# Patient Record
Sex: FEMALE | Race: WHITE | NOT HISPANIC OR LATINO | Employment: OTHER | URBAN - METROPOLITAN AREA
[De-identification: names, ages, dates, MRNs, and addresses within clinical notes are randomized per-mention and may not be internally consistent; named-entity substitution may affect disease eponyms.]

---

## 2023-10-08 PROBLEM — M19.079 ARTHRITIS OF JOINT OF TOE: Status: ACTIVE | Noted: 2023-10-08

## 2023-10-08 PROBLEM — S90.30XA FOOT CONTUSION: Status: ACTIVE | Noted: 2023-10-08

## 2023-10-08 PROBLEM — M19.079 OA (OSTEOARTHRITIS) OF FOOT: Status: ACTIVE | Noted: 2023-10-08

## 2023-10-08 PROBLEM — G57.01 PIRIFORMIS SYNDROME OF RIGHT SIDE: Status: ACTIVE | Noted: 2023-10-08

## 2023-10-08 PROBLEM — S92.909K NONUNION OF FRACTURE OF FOOT: Status: ACTIVE | Noted: 2023-10-08

## 2023-10-08 RX ORDER — AMOXICILLIN AND CLAVULANATE POTASSIUM 875; 125 MG/1; MG/1
TABLET, FILM COATED ORAL
COMMUNITY
Start: 2023-04-14

## 2023-10-08 RX ORDER — MELOXICAM 15 MG/1
1 TABLET ORAL
COMMUNITY
Start: 2023-02-11

## 2023-10-08 RX ORDER — NITROFURANTOIN 25; 75 MG/1; MG/1
1 CAPSULE ORAL
COMMUNITY
Start: 2023-02-01

## 2023-10-08 RX ORDER — SIMVASTATIN 80 MG/1
1 TABLET, FILM COATED ORAL
COMMUNITY
Start: 2023-03-12

## 2023-10-08 RX ORDER — ZOLPIDEM TARTRATE 5 MG/1
1 TABLET ORAL
COMMUNITY
Start: 2023-04-18

## 2023-10-08 RX ORDER — ESCITALOPRAM OXALATE 20 MG/1
1 TABLET ORAL
COMMUNITY
Start: 2023-04-13

## 2023-10-08 RX ORDER — TOPIRAMATE 50 MG/1
1 TABLET, FILM COATED ORAL
COMMUNITY
Start: 2023-03-07

## 2023-10-08 RX ORDER — OMEPRAZOLE 40 MG/1
1 CAPSULE, DELAYED RELEASE ORAL
COMMUNITY
Start: 2023-04-06

## 2023-10-10 ENCOUNTER — ANCILLARY PROCEDURE (OUTPATIENT)
Dept: RADIOLOGY | Facility: CLINIC | Age: 78
End: 2023-10-10
Payer: MEDICARE

## 2023-10-10 ENCOUNTER — OFFICE VISIT (OUTPATIENT)
Dept: ORTHOPEDIC SURGERY | Facility: CLINIC | Age: 78
End: 2023-10-10
Payer: MEDICARE

## 2023-10-10 DIAGNOSIS — M25.551 PAIN OF RIGHT HIP: ICD-10-CM

## 2023-10-10 DIAGNOSIS — M70.61 TROCHANTERIC BURSITIS OF RIGHT HIP: Primary | ICD-10-CM

## 2023-10-10 PROCEDURE — 73502 X-RAY EXAM HIP UNI 2-3 VIEWS: CPT | Mod: RIGHT SIDE | Performed by: ORTHOPAEDIC SURGERY

## 2023-10-10 PROCEDURE — 2500000005 HC RX 250 GENERAL PHARMACY W/O HCPCS: Mod: PO | Performed by: ORTHOPAEDIC SURGERY

## 2023-10-10 PROCEDURE — 73502 X-RAY EXAM HIP UNI 2-3 VIEWS: CPT | Mod: RT,FY

## 2023-10-10 PROCEDURE — 2500000004 HC RX 250 GENERAL PHARMACY W/ HCPCS (ALT 636 FOR OP/ED): Mod: PO | Performed by: ORTHOPAEDIC SURGERY

## 2023-10-10 PROCEDURE — 20610 DRAIN/INJ JOINT/BURSA W/O US: CPT | Mod: PO | Performed by: ORTHOPAEDIC SURGERY

## 2023-10-10 PROCEDURE — 99214 OFFICE O/P EST MOD 30 MIN: CPT | Mod: PO | Performed by: ORTHOPAEDIC SURGERY

## 2023-10-10 PROCEDURE — 20610 DRAIN/INJ JOINT/BURSA W/O US: CPT | Performed by: ORTHOPAEDIC SURGERY

## 2023-10-10 PROCEDURE — 1159F MED LIST DOCD IN RCRD: CPT | Performed by: ORTHOPAEDIC SURGERY

## 2023-10-10 PROCEDURE — 99214 OFFICE O/P EST MOD 30 MIN: CPT | Performed by: ORTHOPAEDIC SURGERY

## 2023-10-10 RX ORDER — BETAMETHASONE SODIUM PHOSPHATE AND BETAMETHASONE ACETATE 3; 3 MG/ML; MG/ML
6 INJECTION, SUSPENSION INTRA-ARTICULAR; INTRALESIONAL; INTRAMUSCULAR; SOFT TISSUE ONCE
Status: COMPLETED | OUTPATIENT
Start: 2023-10-10 | End: 2023-10-10

## 2023-10-10 RX ORDER — LIDOCAINE HYDROCHLORIDE 10 MG/ML
2 INJECTION INFILTRATION; PERINEURAL ONCE
Status: COMPLETED | OUTPATIENT
Start: 2023-10-10 | End: 2023-10-10

## 2023-10-10 RX ADMIN — LIDOCAINE HYDROCHLORIDE 20 MG: 10 INJECTION, SOLUTION INFILTRATION; PERINEURAL at 15:25

## 2023-10-10 RX ADMIN — BETAMETHASONE SODIUM PHOSPHATE AND BETAMETHASONE ACETATE 6 MG: 3; 3 INJECTION, SUSPENSION INTRA-ARTICULAR; INTRALESIONAL; INTRAMUSCULAR at 15:25

## 2023-10-11 NOTE — PROGRESS NOTES
"                    History of present illness    78-year-old female presents complaining of right-sided hip pain she states the pain is mostly along the lateral side of the right hip and into the posterior aspect she has a history of bursitis and piriformis syndrome on the left side she had previous injections which gave her good relief.  She feels like her pelvis is \"off\" denies any numbness or tingling most of her soreness is in the lateral and posterior aspect of the hip      Past medical , Surgical, Family and social history reviewed.      Physical exam  General: No acute distress and breathing comfortably.  Patient is pleasant and cooperative with the examination.    Extremity  Good range of motion of the hip.  Flexion abduction external rotation maneuver is negative.  Moderate tenderness to palpation along the lateral side of the hip.  Pain with cross leg abduction.  Neurologically intact distally with full range of motion of the knee and ankle.  Good muscle strength distally with good sensation.  Compartments are soft calf is nontender.    Diagnostics  [ none]  XR hip right 2 or 3 views    Result Date: 10/10/2023  Interpreted By:  Andi Mohan, STUDY: XR HIP RIGHT 2 OR 3 VIEWS;  ;  10/10/2023 2:42 pm   INDICATION: Signs/Symptoms:pain.   ACCESSION NUMBER(S): [id number]   ORDERING CLINICIAN: ANDI MOHAN   FINDINGS: AP AP pelvis and lateral view of the right hip. The heart mild to moderate medial joint space narrowing bilateral hips slightly worse on the right than the left no evidence of fracture dislocation or other bony abnormality     Signed by: Andi Mohan 10/10/2023 2:49 PM Dictation workstation:   [id number]    NM LUNG VENT / PERF VQ    Result Date: 9/28/2023  * * *Final Report* * * DATE OF EXAM: Sep 28 2023  3:50PM   [facility]   0032  -  NM LUNG VENT / PERF VQ  / ACCESSION #  [id number] PROCEDURE REASON: Pulmonary hypertension (HCC)      * * * * Physician Interpretation * * * *  LUNG SCAN " CLINICAL HISTORY: Shortness of breath. TECHNIQUE: 0.8 mCi Tc 99m DTPA aerosol inhaled 5.1 mCi Tc 99m MAA IV Planar imaging of the lungs in multiple views (anterior, posterior, bilateral oblique and lateral views). RESULT: Ventilation images demonstrate clumping of the radiotracer centrally Decreased uptake at the posterior aspect of the left lower lobe appears somewhat nonsegmental, and perhaps partially mismatched    IMPRESSION: INTERMEDIATE probability, left lower lobe partial mismatch is favored to be artifactual, unresolved PE not excluded. Uniform uptake in the remaining bilateral lungs : [facility]   Transcribe Date/Time: Sep 28 2023  8:18P Dictated by : AUDREY REYNOLDS MD This examination was interpreted and the report reviewed and electronically signed by: AUDREY REYNOLDS MD on Sep 28 2023  9:01PM  EST       Procedure  Before aspiration/injection, the risks  of this procedure including but not limited to;  infection, local skin irritation, skin atrophy, calcification, continued pain or discomfort, elevated blood sugar, burning, failure to relieve pain, possible late infection were all discussed with the patient.  The patient verbalized understanding and consented to the procedure.   After informed consent was provided, patient identification was confirmed, and allergies were verified, the patient was appropriately positioned. The site was marked and time-out performed.  The injection site was prepped in the usual sterile manner to provide a sterile environment. The skin was anesthetized with ethyl chloride spray. The aspiration/injection was performed with standard technique. The needle was withdrawn and the puncture site was secured with a Band-Aid. The patient tolerated the procedure well without complication.   Post-procedure discomfort can be alleviated with additional medication, ice, elevation, and rest over the first 24 hours as recommended.  The injection was right hip bursa 1 cc Celestone  2 cc lidocaine      Assessment  Right hip bursitis with piriformis syndrome and SI joint dysfunction    Treatment plan  1.  The natural history of the condition and its associated treatment alternatives including surgical and nonsurgical options were discussed with the patient at length.  2.  Recommended cortisone injection right hip which was performed today with her consent without complication.  Patient understands the cortisone may provide temporary relief how much it helps her how long it lasts is unpredictable.  Cortisone can be repeated after 3 months if symptoms return.  She is also going to physical therapy for her core strength and pelvic stability and she will follow-up with me in a month.  3. [   ]  4.  All of the patient's questions were answered.    This note was prepared using voice recognition software.  The details of this note are correct and have been reviewed, and corrected to the best of my ability.  Some grammatical areas may persist related to the Dragon software    Juan Carlos Mohan MD  Senior Attending Physician  [facility]  Orthopedic [place]    [phone]

## 2023-11-14 ENCOUNTER — OFFICE VISIT (OUTPATIENT)
Dept: ORTHOPEDIC SURGERY | Facility: CLINIC | Age: 78
End: 2023-11-14
Payer: MEDICARE

## 2023-11-14 DIAGNOSIS — M70.61 TROCHANTERIC BURSITIS OF RIGHT HIP: Primary | ICD-10-CM

## 2023-11-14 PROCEDURE — 1159F MED LIST DOCD IN RCRD: CPT | Performed by: ORTHOPAEDIC SURGERY

## 2023-11-14 PROCEDURE — 99213 OFFICE O/P EST LOW 20 MIN: CPT | Mod: PO | Performed by: ORTHOPAEDIC SURGERY

## 2023-11-14 PROCEDURE — 99213 OFFICE O/P EST LOW 20 MIN: CPT | Performed by: ORTHOPAEDIC SURGERY

## 2023-11-14 NOTE — PROGRESS NOTES
History of present illness    78-year-old female follow-up right hip bursitis had a cortisone injection at her last visit on October 10 states she is 80% better still little bit achy and sore but she know she is got back issues also no numbness or ting no fevers or chills she has some difficulty getting in and out of a car      Past medical , Surgical, Family and social history reviewed.      Physical exam  General: No acute distress and breathing comfortably.  Patient is pleasant and cooperative with the examination.    Extremity  Good range of motion of her hip minimal tenderness ambulates without alimp neurologically intact distally brisk capillary refill compartments soft calves nontender    Diagnostics      No results found.     Procedure  [ none]    Assessment  Trochanteric bursitis right hip    Treatment plan  1.  The natural history of the condition and its associated treatment alternatives including surgical and nonsurgical options were discussed with the patient at length.  2.  Patient understands the cortisone may provide temporary relief how much it helps her how long it lasts is unpredictable.  Cortisone can be repeated after 3 months if symptoms return.  3.  Follow-up as needed  4.  All of the patient's questions were answered.    This note was prepared using voice recognition software.  The details of this note are correct and have been reviewed, and corrected to the best of my ability.  Some grammatical areas may persist related to the Dragon software    Juan Carlos Mohan MD  Senior Attending Physician  [facility]  Orthopedic [place]    [phone]

## 2024-04-02 ENCOUNTER — OFFICE VISIT (OUTPATIENT)
Dept: ORTHOPEDIC SURGERY | Facility: CLINIC | Age: 79
End: 2024-04-02
Payer: MEDICARE

## 2024-04-02 DIAGNOSIS — M70.61 TROCHANTERIC BURSITIS OF RIGHT HIP: Primary | ICD-10-CM

## 2024-04-02 PROCEDURE — 1157F ADVNC CARE PLAN IN RCRD: CPT | Performed by: ORTHOPAEDIC SURGERY

## 2024-04-02 PROCEDURE — 99213 OFFICE O/P EST LOW 20 MIN: CPT | Performed by: ORTHOPAEDIC SURGERY

## 2024-04-02 PROCEDURE — 2500000005 HC RX 250 GENERAL PHARMACY W/O HCPCS: Performed by: ORTHOPAEDIC SURGERY

## 2024-04-02 PROCEDURE — 99214 OFFICE O/P EST MOD 30 MIN: CPT | Performed by: ORTHOPAEDIC SURGERY

## 2024-04-02 PROCEDURE — 20610 DRAIN/INJ JOINT/BURSA W/O US: CPT | Performed by: ORTHOPAEDIC SURGERY

## 2024-04-02 PROCEDURE — 2500000004 HC RX 250 GENERAL PHARMACY W/ HCPCS (ALT 636 FOR OP/ED): Performed by: ORTHOPAEDIC SURGERY

## 2024-04-02 RX ORDER — BETAMETHASONE SODIUM PHOSPHATE AND BETAMETHASONE ACETATE 3; 3 MG/ML; MG/ML
1 INJECTION, SUSPENSION INTRA-ARTICULAR; INTRALESIONAL; INTRAMUSCULAR; SOFT TISSUE
Status: COMPLETED | OUTPATIENT
Start: 2024-04-02 | End: 2024-04-02

## 2024-04-02 RX ORDER — LIDOCAINE HYDROCHLORIDE 10 MG/ML
2 INJECTION INFILTRATION; PERINEURAL
Status: COMPLETED | OUTPATIENT
Start: 2024-04-02 | End: 2024-04-02

## 2024-04-02 RX ADMIN — BETAMETHASONE ACETATE AND BETAMETHASONE SODIUM PHOSPHATE 1 ML: 3; 3 INJECTION, SUSPENSION INTRA-ARTICULAR; INTRALESIONAL; INTRAMUSCULAR; SOFT TISSUE at 08:25

## 2024-04-02 RX ADMIN — LIDOCAINE HYDROCHLORIDE 2 ML: 10 INJECTION, SOLUTION INFILTRATION; PERINEURAL at 08:25

## 2024-04-02 NOTE — PROGRESS NOTES
History of present illness    78-year-old female I seen previously presents complaining of pain along the lateral side of her right hip she has had a history of trochanteric bursitis had good result from previous cortisone injection she states the pain has returned does not have any groin pain no numbness or tingling no fevers or chills she has pain at night pain when she lays on that side      Past medical , Surgical, Family and social history reviewed.      Physical exam  General: No acute distress and breathing comfortably.  Patient is pleasant and cooperative with the examination.    Extremity  Good range of motion of the hip.  Flexion abduction external rotation maneuver is negative.  Moderate tenderness to palpation along the lateral side of the hip.  Pain with cross leg abduction.  Neurologically intact distally with full range of motion of the knee and ankle.  Good muscle strength distally with good sensation.  Compartments are soft calf is nontender.    Diagnostics      No results found.     Procedure  See dictated procedure note    Assessment  Trochanteric bursitis right hip    Treatment plan  1.  The natural history of the condition and its associated treatment alternatives including surgical and nonsurgical options were discussed with the patient at length.  2.  Cortisone injection right hip performed today without complication.  Patient understands the cortisone may provide temporary relief how much it helps her how long it lasts is unpredictable.  Cortisone can be repeated after 3 months if symptoms return.  3.  Prescription for outpatient physical therapy for hip bursitis follow-up with me as needed.  4.  All of the patient's questions were answered.    Orders Placed This Encounter    Disability Placard    Referral to Physical Therapy       This note was prepared using voice recognition software.  The details of this note are correct and have been reviewed, and corrected to the  best of my ability.  Some grammatical areas may persist related to the Dragon software    Juan Carlos Mohan MD  Senior Attending Physician  [facility]    [phone]    Injection/Arthrocentesis: R greater trochanteric bursa on 4/2/2024 8:25 AM  Indications: pain and diagnostic evaluation  Details: 25 G needle, lateral approach  Medications: 2 mL lidocaine 10 mg/mL (1 %); 1 mL betamethasone acet,sod phos 6 mg/mL  Outcome: tolerated well, no immediate complications  Procedure, treatment alternatives, risks and benefits explained, specific risks discussed. Consent was given by the patient. Immediately prior to procedure a time out was called to verify the correct patient, procedure, equipment, support staff and site/side marked as required. Patient was prepped and draped in the usual sterile fashion.

## 2024-04-15 ENCOUNTER — CLINICAL SUPPORT (OUTPATIENT)
Dept: ORTHOPEDIC SURGERY | Facility: CLINIC | Age: 79
End: 2024-04-15
Payer: MEDICARE

## 2024-04-15 ENCOUNTER — OFFICE VISIT (OUTPATIENT)
Dept: ORTHOPEDIC SURGERY | Facility: CLINIC | Age: 79
End: 2024-04-15
Payer: MEDICARE

## 2024-04-15 DIAGNOSIS — M20.42 ACQUIRED HAMMER TOE DEFORMITY OF LESSER TOE OF LEFT FOOT: ICD-10-CM

## 2024-04-15 DIAGNOSIS — M79.672 LEFT FOOT PAIN: ICD-10-CM

## 2024-04-15 PROCEDURE — 1159F MED LIST DOCD IN RCRD: CPT | Performed by: FAMILY MEDICINE

## 2024-04-15 PROCEDURE — 1157F ADVNC CARE PLAN IN RCRD: CPT | Performed by: FAMILY MEDICINE

## 2024-04-15 PROCEDURE — 99213 OFFICE O/P EST LOW 20 MIN: CPT | Performed by: FAMILY MEDICINE

## 2024-04-15 PROCEDURE — 1160F RVW MEDS BY RX/DR IN RCRD: CPT | Performed by: FAMILY MEDICINE

## 2024-04-15 PROCEDURE — 73630 X-RAY EXAM OF FOOT: CPT | Mod: LEFT SIDE | Performed by: FAMILY MEDICINE

## 2024-04-15 NOTE — PROGRESS NOTES
Established Patient Follow-Up Visit    CC:   Chief Complaint   Patient presents with    Left Foot - Pain     History of 5th digit pain, USG cortisone injection 11/21/22  Updated x-rays today       HPI:  Karyn is a 78 y.o. female returns here today for follow-up visit regarding: Chronic pain and discomfort to the left small toe.  She states it does not bother her every single day but is starting to increase pain and discomfort.  She has been utilizing topical Voltaren gel.  She has bought wide toe box shoes.  She presents here today for further evaluation.  She denies any new injury or trauma no numbness tingling or burning.  Has some mild midfoot pain bilaterally but is not a concern today.        REVIEW OF SYSTEMS:  GENERAL: Negative for malaise, significant weight loss, fever  MUSCULOSKELETAL: See HPI  NEURO: Negative for numbness / tingling       PHYSICAL EXAM:  -Neuro: Gross sensation intact to the lower extremities bilaterally.  -Extremity: Left small toe exam demonstrates some mild red irritation over the PIP joint.  There is obvious hammertoe deformity seen with tight extensor tendons.  Moderate tenderness with firm and light touch throughout the digit.  There is normal skin sensation and good distal cap refill.    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  Also recommend she follow-up with podiatry for further evaluation as I cannot convince a repeat injection  will do anything for the hammer toeing.  Patient was agreeable to the plan.  I will see her back as needed going forward.  Orders Placed This Encounter    XR foot left 3+ views    Referral to Podiatry           At the conclusion of the visit there were no further questions by the patient/family regarding their plan of care.  Patient was instructed to call or return with any issues, questions, or concerns regarding their injury and/or treatment plan described above.     04/15/24 at 9:40 AM - Cole C Budinsky, MD    Office: [phone]    This note was prepared using voice recognition software.  The details of this note are correct and have been reviewed, and corrected to the best of my ability.  Some grammatical errors may persist related to the Dragon software.

## 2024-04-17 NOTE — PROGRESS NOTES
"  Physical Therapy  Physical Therapy Evaluation and Treatment    Patient Name: Karyn Juan  MRN: [id number]  Today's Date: 4/18/2024  Time Calculation  Start Time: 0900  Stop Time: 0945  Time Calculation (min): 45 min    Insurance:  Visit number: 1  Authorization info: No auth required  Insurance Type: Medicare    General:  Reason for visit: R hip trochanteric bursitis  Referred by: Juan Carlos Mohan MD    Current Problem:  1. Trochanteric bursitis of right hip  Referral to Physical Therapy    Follow Up In Physical Therapy      2. Chronic right hip pain        3. Weakness of right hip                Medical History Form: Reviewed (scanned into chart)    Subjective:     HPI: Patient presents to PT with c/o chronic R hip pain. Reports pain/problem began about 10 years ago. Reports she has gotten injections for this approximately every 6 months for the past few years. Reports she has tried exercises and chiropractors and has not had success. Reports pain is worse laying on R side and with prolonged sitting. Denies any radiating pain or n/t. Reports she fell down her stairs a long time ago and hit her tail bone, but is unsure if she can attribute that to this pain.       Chief Complaint: Patient presents to clinic with c/o chronic R hip pain.   Onset Date: 04/18/2014  JASS: Insidious    Current Condition:   Worse    Pertinent PMH includes: osteoporosis  PSHx: R TKA > 15 years ago, R shoulder > 5 years ago    Pain:     Location: R hip posterior/lateral aspect  Description: burning  Worst: 10/10  Best: 3/10  Current: 3/110  Aggravating Factors:  sitting, laying on R side (difficult sleeping), steps, lifting leg into car  Relieving Factors:  ice, biofreeze      Relevant Information (PMH & Previous Tests/Imaging):     XR Right hip 10/10/23: \"FINDINGS:  AP AP pelvis and lateral view of the right hip. The heart mild to  moderate medial joint space narrowing bilateral hips slightly worse  on the right than the left no evidence " "of fracture dislocation or  other bony abnormality\"    Previous Interventions/Treatments: Physical Therapy    Prior Level of Function (PLOF)  Patient previously independent with all ADLs  Exercise/Physical Activity: walking  Work/School: retired  Biggest limitation: step ladders, steps  Chief complaint: pain       Patients Living Environment: Reviewed and no concern    Primary Language: English    Patient's Goal(s) for Therapy: \"for it not to hurt as much\"     Red Flags: Do you have any of the following? No  Fever/chills, unexplained weight changes, dizziness/fainting, unexplained change in bowel or bladder functions, unexplained malaise or muscle weakness, night pain/sweats, numbness or tingling    Objective:    Lumbar AROM screen - unremarkable     Gait Assessment - decreased stance time on right, slight lurch     AROM  Right hip flexion: WNL    Left hip flexion:  WNL  Right hip ER seated: 35 degrees     Left hip ER seated: 40 degrees   Right hip IR seated: 30 degrees    Left hip IR seated: 30 degrees     MMT  Right hip ER: 3+/5    Left hip ER: 4-/5   Right hip IR: 4/5    Left hip IR: 4/5  Right quadriceps: 4+/5    Left quadriceps: 5/5  Right iliopsoas:  3+/5   Left iliopsoas: 4/5   Right gluteus medius: 4/5   Left gluteus medius: 4/5  Right gluteus regine: 4-/5   Left gluteus regine: 4-/5  Right hamstrin-/5    Left hamstrin/5      Palpation  TTP R ischial tuberosity  No TTP R greater trochanter, piriformis, hamstring, or SIJ      Special Tests    Single leg stance test 30 seconds (-)   CHIARA (-)  FADIR (-)   Scour (-)   Piriformis test (-)     [place] Cluster: (+) of 3-4/5 to rule in SIJ    Thigh thrust (-)  Distraction (-)  Compression (-)        Posture: forward with rounded shoulders    Lower Extremity Functional Movements  Transfers: independent with B UE support        Outcome Measures:  LEFS: 60/80       EDUCATION: Pt educated in HEP, PT POC, anatomy. pt demonstrates understanding of PT " info, all questions answered.    HEP:    SLR 2x10  Bridges 2x10  Supine HS isometrics 2x10 5 sec holds  Sidelying hip Abduction 2x10      Goals: Set and discussed today    1.) Independent with HEP to expedite progress and promote goal achievement.  2.) Reduce worst reported pain within last 48 hours to < 4/10 in order to allow patient to perform daily tasks with decreased discomfort.  3.) Patient will demonstrate improved functional mobility with reduced fall risk with performance of 5x STS with 0 UE support < 14 seconds  4.) Improve B hip strength to >/= 4/5 in order to allow patient to navigate stairs and step up into car with decreased discomfort.  5.) Report change in LEFS by greater than or equal to 9 points to meet the MCID (IE 60/80)          Plan of care was developed with input and agreement by the patient.    Treatment Performed:    Therapeutic Exercise:    12 min  SLR 1x10  Bridges 1x10  Supine HS isometrics 1x10 5 sec holds  Sidelying hip Abduction 1x10      Assessment: 79 y/o F presents with c/o chronic R hip pain. Upon examination patient demonstrates TTP to R ischial tuberosity, decreased R hip strength, and decreased functional mobility evidenced by STS with B UE support limiting overall functional mobility including navigating steps. Activity limitations and participations restrictions include navigating steps and sitting for long periods. Pt presentation consistent with dx of chronic R hip pain and weakness. Pt to benefit from outpatient PT to address deficits, maximize functional mobility and improve QOL.  The clinical presentation of this patient is stable and their history and examination findings are consistent with a low complexity evaluation with good rehab potential.           Plan:   Perform 5x STS along with progression of hip strengthening interventions per patient tolerance.   Planned Interventions include: therapeutic exercise, self-care home management, manual therapy, therapeutic  activities, gait training, neuromuscular coordination, vasopneumatic, dry needling, aquatic therapy  Frequency: 1 x Week  Duration: 6 Weeks      Jose D Bowser, PT

## 2024-04-18 ENCOUNTER — EVALUATION (OUTPATIENT)
Dept: PHYSICAL THERAPY | Facility: CLINIC | Age: 79
End: 2024-04-18
Payer: MEDICARE

## 2024-04-18 DIAGNOSIS — M25.551 CHRONIC RIGHT HIP PAIN: ICD-10-CM

## 2024-04-18 DIAGNOSIS — M70.61 TROCHANTERIC BURSITIS OF RIGHT HIP: Primary | ICD-10-CM

## 2024-04-18 DIAGNOSIS — R29.898 WEAKNESS OF RIGHT HIP: ICD-10-CM

## 2024-04-18 DIAGNOSIS — G89.29 CHRONIC RIGHT HIP PAIN: ICD-10-CM

## 2024-04-18 PROCEDURE — 97110 THERAPEUTIC EXERCISES: CPT | Mod: GP

## 2024-04-18 PROCEDURE — 97161 PT EVAL LOW COMPLEX 20 MIN: CPT | Mod: GP

## 2024-04-18 ASSESSMENT — PATIENT HEALTH QUESTIONNAIRE - PHQ9
2. FEELING DOWN, DEPRESSED OR HOPELESS: NOT AT ALL
SUM OF ALL RESPONSES TO PHQ9 QUESTIONS 1 AND 2: 0
1. LITTLE INTEREST OR PLEASURE IN DOING THINGS: NOT AT ALL

## 2024-05-02 ENCOUNTER — APPOINTMENT (OUTPATIENT)
Dept: PHYSICAL THERAPY | Facility: CLINIC | Age: 79
End: 2024-05-02
Payer: MEDICARE

## 2024-05-10 ENCOUNTER — APPOINTMENT (OUTPATIENT)
Dept: ORTHOPEDIC SURGERY | Facility: CLINIC | Age: 79
End: 2024-05-10
Payer: MEDICARE

## 2024-05-22 NOTE — PROGRESS NOTES
Physical Therapy Treatment    Patient Name: Karyn Juan  MRN: [id number]  Today's Date: 2024   Start Time: 950  Stop Time: 1030  Total time: 40 minutes    PT Therapeutic Procedures Time Entry  Therapeutic Exercise Time Entry: 40                   Current Problem  1. Chronic right hip pain        2. Weakness of right hip        3. Trochanteric bursitis of right hip            Insurance   Payer: Medicare  Visit Number: 2      Precautions         Subjective     Patient presents to PT for re-assessment. Reports she had a fall since last visit. States she tripped over something on the ground outside while carrying tree branches. Denies any injuries or hitting her head. States she had to miss a few visits due to sudden passing of her granddaughter. Feels she is doing no different since starting PT. Reports occasional compliance with HEP. Denies any other outstanding concerns. Pain 0/10 at start of session, but reports pain is worse at night after long day and after laying down on it. Worst in last 48 hours was 9/10. States she uses patches to relieve pain and it is successful.         Pain         Objective     Observation    Lumbar AROM screen:    Gait Assessment - wide MONA with lateral trunk sway.    AROM  Right hip ER seated: 31 degrees   Left hip ER seated: 35 degrees  Right hip IR seated: 21 degrees    Left hip IR seated: 28 degrees     MMT  Right hip ER: 4/5    Left hip ER: 4/5  Right hip IR: 4/5   Left hip IR: 4/5  Right quadriceps: 5/5   Left quadriceps: 5/5  Right iliopsoas: 3+/5   Left iliopsoas: 4/5  Right gluteus medius: 4-/5   Left gluteus medius: 4-/5  Right gluteus regine: 4-/5   Left gluteus regine: 4/5  Right hamstrin+/5    Left hamstrin+/5        Palpation  TTP R greater trochanter      Special Tests  30 second sit to stand - x12 with 0 UE support        Treatments:  There Ex: 60044 40/3  Review of goals and objective measures - 10'  SLR 2x12  Bridges 2 sec holds 2x12  Supine HS  isometrics x10 5 sec holds  Supine Hip ABD iso vs GTB 3 sec holds 2x10  Staggered STS 1x10 each  Step ups 6 in. Step 1x10 each      Assessment:     Upon re-assessment of objective measures patient demonstrated decreased hip AROM, minimal improvement in hip strength, TTP to R greater trochanter, compensatory gait strategies, and limited functional strength with performance of 30 second sit to stand. Tolerated treatment session well with no c/o increase in pain sx throughout. Patient appropriately challenged with interventions with patient verbalizing muscular fatigue post session. Updated HEP to include SLR, bridges, Supine HS isometrics, and supine hip ABD iso vs GTB. Patient verbalized and demonstrated understanding of updated HEP. Recommending patient continue with PT services 1x/wk for 8 visits at this time to continue progression towards established goals and return to PLOF.       Plan:     1x/wk for 8 visits with me or whoever is available. Progress B LE strengthening, mobility, balance, and endurance per patient tolerance.     Goals:     1.) Independent with HEP to expedite progress and promote goal achievement. (5/23/24 - progressing)  2.) Reduce worst reported pain within last 48 hours to < 4/10 in order to allow patient to perform daily tasks with decreased discomfort. (5/23/24 - progressing)  3.) Patient will demonstrate improved functional mobility with reduced fall risk with performance of 5x STS with 0 UE support < 14 seconds (5/23/24 - discontinued)  4.) Improve B hip strength to >/= 4/5 in order to allow patient to navigate stairs and step up into car with decreased discomfort. (5/23/24 - progressing)  5.) Report change in LEFS by greater than or equal to 9 points to meet the MCID (IE 60/80) (5/23/24 - not assessed today)  6.) Demonstrated improved strength evidenced by improved score on 30 second sit to stand with performance of +5 transfers to meet MCID. (IE 12 with 0 UE support).

## 2024-05-23 ENCOUNTER — TREATMENT (OUTPATIENT)
Dept: PHYSICAL THERAPY | Facility: CLINIC | Age: 79
End: 2024-05-23
Payer: MEDICARE

## 2024-05-23 DIAGNOSIS — M25.551 CHRONIC RIGHT HIP PAIN: Primary | ICD-10-CM

## 2024-05-23 DIAGNOSIS — R29.898 WEAKNESS OF RIGHT HIP: ICD-10-CM

## 2024-05-23 DIAGNOSIS — M70.61 TROCHANTERIC BURSITIS OF RIGHT HIP: ICD-10-CM

## 2024-05-23 DIAGNOSIS — G89.29 CHRONIC RIGHT HIP PAIN: Primary | ICD-10-CM

## 2024-05-23 PROCEDURE — 97110 THERAPEUTIC EXERCISES: CPT | Mod: GP

## 2024-05-28 ENCOUNTER — APPOINTMENT (OUTPATIENT)
Dept: PHYSICAL THERAPY | Facility: CLINIC | Age: 79
End: 2024-05-28
Payer: MEDICARE

## 2024-05-28 DIAGNOSIS — M25.551 CHRONIC RIGHT HIP PAIN: Primary | ICD-10-CM

## 2024-05-28 DIAGNOSIS — R29.898 WEAKNESS OF RIGHT HIP: ICD-10-CM

## 2024-05-28 DIAGNOSIS — G89.29 CHRONIC RIGHT HIP PAIN: Primary | ICD-10-CM

## 2024-05-28 DIAGNOSIS — M70.61 TROCHANTERIC BURSITIS OF RIGHT HIP: ICD-10-CM

## 2024-06-05 ENCOUNTER — TREATMENT (OUTPATIENT)
Dept: PHYSICAL THERAPY | Facility: CLINIC | Age: 79
End: 2024-06-05
Payer: MEDICARE

## 2024-06-05 DIAGNOSIS — R29.898 WEAKNESS OF RIGHT HIP: ICD-10-CM

## 2024-06-05 DIAGNOSIS — G89.29 CHRONIC RIGHT HIP PAIN: Primary | ICD-10-CM

## 2024-06-05 DIAGNOSIS — M70.61 TROCHANTERIC BURSITIS OF RIGHT HIP: ICD-10-CM

## 2024-06-05 DIAGNOSIS — M25.551 CHRONIC RIGHT HIP PAIN: Primary | ICD-10-CM

## 2024-06-05 PROCEDURE — 97110 THERAPEUTIC EXERCISES: CPT | Mod: GP,CQ

## 2024-06-05 PROCEDURE — 97112 NEUROMUSCULAR REEDUCATION: CPT | Mod: GP,CQ

## 2024-06-12 ENCOUNTER — TREATMENT (OUTPATIENT)
Dept: PHYSICAL THERAPY | Facility: CLINIC | Age: 79
End: 2024-06-12
Payer: MEDICARE

## 2024-06-12 DIAGNOSIS — M70.61 TROCHANTERIC BURSITIS OF RIGHT HIP: ICD-10-CM

## 2024-06-12 PROCEDURE — 97110 THERAPEUTIC EXERCISES: CPT | Mod: GP

## 2024-06-12 NOTE — PROGRESS NOTES
Physical Therapy Treatment    Patient Name: Karyn Juan  MRN: [id number]  Today's Date: 6/12/2024     PT Therapeutic Procedures Time Entry  Therapeutic Exercise Time Entry: 40  Start Time: 0901  Stop Time: 0941  Total time: 40 minutes                     Current Problem  1. 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 Reports no significant or concerning since last visit. Feels they are doing about the same since starting PT. Reports compliance with HEP. Denies any other outstanding concerns. Pain 0/10 at start of session with only having pain when sleeping on side.         Pain         Objective     Treatments:  There Ex: [id number] 40/3    SLR 2x12 (0#, 2#)  Bridges 1x12  Staggered bridges 1x12 each  SL clamshell 2x10 2 sec holds vs YTB  Standing hip ABD vs YTB 1x15 each  Seated HS curl 2x12 vs YTB  DL leg press 2x10 (80, 100#)  SL leg press 1x15 (60#) each  Static lunge with U UE support 2x10 (R leg forward, L leg back)  Lateral step ups 2x10 8 in. Step  Lateral tap downs with U UE support 4in. Step 2x10      Assessment:     Patient tolerated treatment session well. Demo'd improved strength with good form and tolerance to progression of exercises with increased resistance and repetitions. Minimal VC needed to slow controlled performance with SL standing hip ABD vs YTB. Good retention of cueing with repetitions. Denied increase in pain sx throughout session and verbalized expected muscular fatigue post session. Updated HEP to include static lunges with couch support only with R LE forward to avoid kneeling on R TKA. Instructed patient to continue with previously prescribed HEP to facilitate progression towards established goals. Patient verbalized  understanding of all instructions. Would benefit from continued PT services to further assess and address remaining impairments and progress towards achieving established goals.       Plan:     Progress lumbopelvic and LE strength and mobility per patient tolerance.     Goals:   1.) Independent with HEP to expedite progress and promote goal achievement. (5/23/24 - progressing)  2.) Reduce worst reported pain within last 48 hours to < 4/10 in order to allow patient to perform daily tasks with decreased discomfort. (5/23/24 - progressing)  3.) Patient will demonstrate improved functional mobility with reduced fall risk with performance of 5x STS with 0 UE support < 14 seconds (5/23/24 - discontinued)  4.) Improve B hip strength to >/= 4/5 in order to allow patient to navigate stairs and step up into car with decreased discomfort. (5/23/24 - progressing)  5.) Report change in LEFS by greater than or equal to 9 points to meet the MCID (IE 60/80) (5/23/24 - not assessed today)  6.) Demonstrated improved strength evidenced by improved score on 30 second sit to stand with performance of +5 transfers to meet MCID. (IE 12 with 0 UE support).

## 2024-06-18 NOTE — PROGRESS NOTES
Physical Therapy Treatment    Patient Name: Karyn Juan  MRN: [id number]  Today's Date: 6/19/2024  Time Calculation  Start Time: 0920  Stop Time: 1000  Time Calculation (min): 40 min     PT Therapeutic Procedures Time Entry  Neuromuscular Re-Education Time Entry: 8  Therapeutic Exercise Time Entry: 30                 Current Problem  1. Trochanteric bursitis of right hip            Insurance:  Visit number: 5  Authorization info: No auth required  Insurance Type: Medicare  POC:  Precautions    R TKA > 15 years ago, R shoulder > 5 years ago   : osteoporosis       Subjective   Subjective:   Pt reports that her hip feels about the same, but may feel a little better, as she hasn't had to put patches  on her hip. Laying down tends to bother her hip more, as she is a side sleeper.   Pain   3/10    Objective   Treatments:  There Ex: [id number] 40/3     SLR 2x12 (0#, 2#)12x ea  Bridges 1x15  Staggered bridges 1x15 each  SL clamshell 2x10 2 sec holds vs YTB  Standing hip ABD vs YTB 1x15 each  Seated HS curl 2x12 vs YTB   DL leg press 2x10 (80, 100#)  SL leg press 1x15 (60#) each  Static lunge with U UE support 2x10 (R leg forward, L leg back)  Lateral step ups 2x10 8 in. Step  Lateral tap downs with U UE support 4in. Step 2x10  OP EDUCATION:   Maintaining pelvis in correct alignment with s/l exercises      Assessment:   Pt displays improving R LE strength. She fatigued with SLRs. Reminders given to slow down with ex in order to maximize muscular strength gains. Pt fatigued with standing hip abd. Cues given to not allow the weight to move over to the L side with tap downs. Good overall completion of ex given.    Plan:   Cont to increase R hip strength.

## 2024-06-19 ENCOUNTER — TREATMENT (OUTPATIENT)
Dept: PHYSICAL THERAPY | Facility: CLINIC | Age: 79
End: 2024-06-19
Payer: MEDICARE

## 2024-06-19 DIAGNOSIS — M70.61 TROCHANTERIC BURSITIS OF RIGHT HIP: Primary | ICD-10-CM

## 2024-06-19 PROCEDURE — 97110 THERAPEUTIC EXERCISES: CPT | Mod: GP,CQ

## 2024-06-19 PROCEDURE — 97112 NEUROMUSCULAR REEDUCATION: CPT | Mod: GP,CQ

## 2024-06-25 NOTE — PROGRESS NOTES
Physical Therapy Treatment    Patient Name: Karyn Juan  MRN: [id number]  Today's Date: 6/26/2024  Time Calculation  Start Time: 0918  Stop Time: 1001  Time Calculation (min): 43 min     PT Therapeutic Procedures Time Entry  Neuromuscular Re-Education Time Entry: 10  Therapeutic Exercise Time Entry: 30                 Current Problem  1. Trochanteric bursitis of right hip            Insurance:  Visit number: 6  Authorization info: No auth required  Insurance Type: Medicare  POC:  Precautions   R TKA > 15 years ago, R shoulder > 5 years ago   : osteoporosis       Subjective   Subjective:   Pt reports that her hip is feeling a little better. She doesn't notice as much pain at night.  Pain   0/10    Objective   Treatments:  There Ex: [id number] 40/3     SLR 2x12 (1#, 2#)12x ea  Bridges 1x15  Staggered bridges 1x15 each  SL clamshell 2x10 2 sec holds vs RTB  Sit to stands with staggard stance 15x  Standing hip ABD vs RTB 1x10 each  Seated HS curl 2x12 vs RTB   Side stepping YTB 5 steps x 3 laps  DL leg press 15x ea (80, 100#)  SL leg press 10x ea (60#,70#) each  Static lunge with U UE support 2x10 (R leg forward, L leg back)---  Lateral step ups 2x10 8 in. Step  Lateral tap downs with U UE support 4in. Step 2x10  OP EDUCATION:     Maintain hip alignment with exercises    Assessment:   Able to increase resistance for TB for some exercises, without increased pain. Introduced sidestepping for more hip abd strengthening. Pt did well with this.  Increased # for leg press, while maintaining good control. Pt fatigued with SLR, and had ext lag develop as reps went on.    Plan:   Cont with hip and pelvis stability.

## 2024-06-26 ENCOUNTER — TREATMENT (OUTPATIENT)
Dept: PHYSICAL THERAPY | Facility: CLINIC | Age: 79
End: 2024-06-26
Payer: MEDICARE

## 2024-06-26 DIAGNOSIS — M70.61 TROCHANTERIC BURSITIS OF RIGHT HIP: Primary | ICD-10-CM

## 2024-06-26 PROCEDURE — 97112 NEUROMUSCULAR REEDUCATION: CPT | Mod: GP,CQ

## 2024-06-26 PROCEDURE — 97110 THERAPEUTIC EXERCISES: CPT | Mod: GP,CQ

## 2024-07-02 NOTE — PROGRESS NOTES
Physical Therapy Treatment    Patient Name: Karyn Juan  MRN: [id number]  Today's Date: 7/3/2024   Start Time: 0901  Stop Time: 0941  Total time: 40 minutes    PT Therapeutic Procedures Time Entry  Therapeutic Exercise Time Entry: 40                   Current Problem  1. Trochanteric bursitis of right hip        2. Chronic right hip pain        3. Weakness of right hip            Insurance:  Visit number: 7  Authorization info: No auth required  Insurance Type: Medicare  POC:    Precautions   R TKA > 15 years ago, R shoulder > 5 years ago   : osteoporosis      Subjective   Patient presents to PT  for follow up session. Reports no injuries, falls, or trips to ED since last visit. States that she is not using pain patches anymore and is having easier time sleeping at night as she has not been waking up from pain. Feels they are doing better since starting PT. Reports biggest limitation at this time is not being able to get up from floor without relying on furniture.. Reports occasional compliance with HEP about 4-5x/wk. Denies any other outstanding concerns. Pain 0/10 at start of session.     Objective     Treatments:  There Ex: [id number] 40/3  Supine hip ABD iso vs BlkTB 2x10 3 sec hlds  Staggered bridges 1x15 each  SL leg press 2x12-10 (80#, 90#)  Bar squats x12 - cueing for wide MONA and weight through heels  Air squats x8  Seated HS curl 2x12 GTB  Lateral tap downs Fingertip support/U UE support 2x12 (4in.-6in.) each LE  Band resisted side steps 2x4 laps at window YTB    Not performed today  SL clamshell 2x10 2 sec holds vs RTB  Sit to stands with staggard stance 15x  Standing hip ABD vs RTB 1x10 each  DL leg press 15x ea (80, 100#)  Static lunge with U UE support 2x10 (R leg forward, L leg back)---  Lateral step ups 2x10 8 in. Step    Assessment:     Patient tolerated treatment session well with no c/o increased sx throughout session. Required cueing for full ROM and slow controlled motion with SL leg press and for  wide MONA and for putting weight evenly through foot to improve hip muscle recruitment and improved stability as patient was coming up on to toes with bar and air squats. Improved performance after cueing. Encouraged patient to look into any local community exercise programs she may be interested in to remain active upon discharge from PT to stay accountable with improving and continuing to maintain strength and mobility gained over the past few weeks. Patient verbalized understanding. Would benefit from continued PT services to further assess and address remaining impairments and progress towards achieving established goals.       Plan:  Progress BLE strength and mobility per patient tolerance. Begin to start establishing final HEP with focus on LE and core strengthening like squats, mini lunges, bridging, mechanics for box  from floor, hip ABD strength, along with inclusion of gentle hip mobility and core interventions like chair yoga poses (crescent lunge, warrior 2, etc.)

## 2024-07-03 ENCOUNTER — TREATMENT (OUTPATIENT)
Dept: PHYSICAL THERAPY | Facility: CLINIC | Age: 79
End: 2024-07-03
Payer: MEDICARE

## 2024-07-03 DIAGNOSIS — R29.898 WEAKNESS OF RIGHT HIP: ICD-10-CM

## 2024-07-03 DIAGNOSIS — G89.29 CHRONIC RIGHT HIP PAIN: ICD-10-CM

## 2024-07-03 DIAGNOSIS — M25.551 CHRONIC RIGHT HIP PAIN: ICD-10-CM

## 2024-07-03 DIAGNOSIS — M70.61 TROCHANTERIC BURSITIS OF RIGHT HIP: Primary | ICD-10-CM

## 2024-07-03 PROCEDURE — 97110 THERAPEUTIC EXERCISES: CPT | Mod: GP

## 2024-07-10 ENCOUNTER — APPOINTMENT (OUTPATIENT)
Dept: PHYSICAL THERAPY | Facility: CLINIC | Age: 79
End: 2024-07-10
Payer: MEDICARE

## 2024-07-16 NOTE — PROGRESS NOTES
Physical Therapy Treatment    Patient Name: Karyn Juan  MRN: [id number]  Today's Date: 7/17/2024   Start Time:  0859  Stop Time: 0943  Total time: 44 MINUTES    PT Therapeutic Procedures Time Entry  Therapeutic Exercise Time Entry: 42                   Current Problem  1. Chronic right hip pain        2. Trochanteric bursitis of right hip        3. Weakness of right hip            Insurance:  Visit number: 8  Authorization info: No auth required  Insurance Type: Medicare    Precautions   R TKA > 15 years ago, R shoulder > 5 years ago   : osteoporosis      Subjective   Patient presents to PT  for follow up session. Reports no falls or injuries since last visit. Feels they are doing better since starting PT. States that her hip has been doing well except for the last 2 days where she states she was having trouble sleeping again, but overall better than before still. Reports compliance with HEP. Denies any other outstanding concerns. Pain 0/10 at start of session.       Objective     Treatments:  There Ex: [id number] 42/3    Chair yoga poses - crescent moon and warrior 2 -  5 minutes  Supine quad stretch x2 20 sec holds  Supine hip ABD iso vs BlkTB x10 5 sec hlds *  Marching bridges x10 each *  Chair squats 2x10 (0#, 5#) *  Weighted box pickups (21#) 2x10 *  Split squats with U UE support to foam pad 2x12 each    Staggered bridges 1x15 each  SL leg press 2x12-10 (80#, 90#)  Bar squats x12 - cueing for wide MONA and weight through heels  Seated HS curl 2x12 GTB  Lateral tap downs Fingertip support/U UE support 2x12 (4in.-6in.) each LE  Band resisted side steps 2x4 laps at window YTB     Not performed today  SL clamshell 2x10 2 sec holds vs RTB  Sit to stands with staggard stance 15x  Standing hip ABD vs RTB 1x10 each  DL leg press 15x ea (80, 100#)  Static lunge with U UE support 2x10 (R leg forward, L leg back)---  Lateral step ups 2x10 8 in. Step  Staggered bridges 1x15 each  SL leg press 2x12-10 (80#, 90#)  Bar squats x12  - cueing for wide MONA and weight through heels  Seated HS curl 2x12 GTB  Lateral tap downs Fingertip support/U UE support 2x12 (4in.-6in.) each LE  Band resisted side steps 2x4 laps at window YTB      Assessment:     Patient tolerated treatment session well. No c/o increased pain throughout session. Required minimal VC for correct performance of reviewed HEP. Max visual demo and VC for chair yoga poses which will be reviewed next week. Updated HEP to include all indicated exercises above. Patient encouraged to seek out any community exercise opportunities to perform alongside HEP for continued maintenance and improvement in functional capacity. Patient verbalized understanding. Would benefit from continued PT services to further assess and address remaining impairments and progress towards achieving established goals.       Plan:     Progress to iHEP NV. Include chair yoga poses and split squats to iHEP.

## 2024-07-17 ENCOUNTER — TREATMENT (OUTPATIENT)
Dept: PHYSICAL THERAPY | Facility: CLINIC | Age: 79
End: 2024-07-17
Payer: MEDICARE

## 2024-07-17 DIAGNOSIS — R29.898 WEAKNESS OF RIGHT HIP: ICD-10-CM

## 2024-07-17 DIAGNOSIS — G89.29 CHRONIC RIGHT HIP PAIN: Primary | ICD-10-CM

## 2024-07-17 DIAGNOSIS — M70.61 TROCHANTERIC BURSITIS OF RIGHT HIP: ICD-10-CM

## 2024-07-17 DIAGNOSIS — M25.551 CHRONIC RIGHT HIP PAIN: Primary | ICD-10-CM

## 2024-07-17 PROCEDURE — 97110 THERAPEUTIC EXERCISES: CPT | Mod: GP

## 2024-07-22 ENCOUNTER — APPOINTMENT (OUTPATIENT)
Dept: PHYSICAL THERAPY | Facility: CLINIC | Age: 79
End: 2024-07-22
Payer: MEDICARE

## 2024-07-22 DIAGNOSIS — M25.551 CHRONIC RIGHT HIP PAIN: Primary | ICD-10-CM

## 2024-07-22 DIAGNOSIS — M70.61 TROCHANTERIC BURSITIS OF RIGHT HIP: ICD-10-CM

## 2024-07-22 DIAGNOSIS — G89.29 CHRONIC RIGHT HIP PAIN: Primary | ICD-10-CM

## 2024-07-22 PROCEDURE — 97110 THERAPEUTIC EXERCISES: CPT | Mod: GP

## 2024-07-22 NOTE — PROGRESS NOTES
PHYSICAL THERAPY TREATMENT NOTE    Patient Name:  Karyn Juan   Patient MRN: [id number]  Date: 07/22/24  Time Calculation  Start Time: 1025  Stop Time: 1054  Time Calculation (min): 29 min      Problem List Items Addressed This Visit    None  Visit Diagnoses         Codes    Chronic right hip pain    -  Primary M25.551, G89.29    Trochanteric bursitis of right hip     M70.61            Insurance:  Visit number: 9  Insurance Type: Payor: MEDICARE / Plan: MEDICARE PART A AND B / Product Type: *No Product type* /     General:  Reason for visit: R hip pain   Referred by: MD Albaro Pimentel MD appt:  Not scheduled     Precautions:   R TKA > 15 years ago, R shoulder > 5 years ago   : osteoporosis  Fall Risk: High    Assessment:  Upon re-assessment of objective measures patient demonstrated improved strength, mobility, and improved response on patient reported outcome measure. Upon review of goals and objective measures, patient expressed interest in being placed on 30d ay hold to allow her time to self-assess transition to iHEP. Reviewed chair yoga poses and split squat exercises with patient demonstrating independent and safe performance of interventions. Recommended patient try online videos or in person chair yoga classes as enjoyable supplement for HEP. Patient verbalized understanding. Recommending patient be  placed on 30 day hold from, PT services at this time due to all goals met iHEP. Instructed patient to call office within 30 days if she wishes to resume PT services for this episode. Patient verbalized agreement to updated POC.     Education: Reviewed home exercise program. Answered questions about home exercise program. Educated patient on how to find online chair yoga videos. Recommendations for community chair yoga classes.   Progress towards functional goals: Improved ability to  "sleep thru the night. Reduced frequency of pain. Reduced intensity of pain.  Response to interventions: Patient tolerated therapeutic interventions well and without any adverse events. Improved independence with home exercises. Improved tolerance to strengthening progressions.    Plan:  Karyn will be placed on hold for 30 days.  Instructed patient to call office within 30 days if she wishes to resume PT services for this episode. If she does not, Karyn will be discharged from PT services for this episode of care.       Subjective:   Karyn reports she feels like her condition is improving.  Progress towards functional goal:  Improved ability to sleep thru the night. Reduced frequency of pain. Reduced pain level.Reports a little increase in pain this past week to 1-2/10 with patient requiring pain patch 2 nights ago    Pain (0-10): 0    HEP adherence / understanding: compliance with the instructed home exercises.    Objective:    MMT  Right hip ER: 5   Left hip ER: 5  Right hip IR: 5   Left hip IR: 5  Right quadriceps: 5    Left quadriceps: 5  Right iliopsoas: 4   Left iliopsoas: 4+  Right gluteus medius: 4+   Left gluteus medius: 4+  Right gluteus regine: 4+   Left gluteus regine: 4  Right hamstrin   Left hamstrin    Special Tests  30 second STS - 17x with 0 UE support    Outcome Measures:  LEFS: 70/80    Treatment Performed: (\"NP\" = Not Performed)     Therapeutic Exercise:     29 Minutes    Review of goals and objective measures 10 minutes  Chair yoga poses - crescent moon and warrior 2 -  5 minutes*  Split squats with U UE support to foam pad 2x12 each *     Not performed today  SL clamshell 2x10 2 sec holds vs RTB  Sit to stands with staggard stance 15x  Standing hip ABD vs RTB 1x10 each  DL leg press 15x ea (80, 100#)  Static lunge with U UE support 2x10 (R leg forward, L leg back)---  Lateral step ups 2x10 8 in. Step  Staggered bridges 1x15 each  SL leg press 2x12-10 (80#, 90#)  Bar squats x12 - " cueing for wide MONA and weight through heels  Seated HS curl 2x12 GTB  Lateral tap downs Fingertip support/U UE support 2x12 (4in.-6in.) each LE  Band resisted side steps 2x4 laps at window YTB      Goals:     1.) Independent with HEP to expedite progress and promote goal achievement. (7/22/24 - met)  2.) Reduce worst reported pain within last 48 hours to < 4/10 in order to allow patient to perform daily tasks with decreased discomfort. (7/22/24 - met)  3.) Improve B hip strength to >/= 4/5 in order to allow patient to navigate stairs and step up into car with decreased discomfort. (7/22/24 - met)  4.) Report change in LEFS by greater than or equal to 9 points to meet the MCID (IE 60/80) (7/22/24 - met)  5.) Demonstrated improved strength evidenced by improved score on 30 second sit to stand with performance of +5 transfers to meet MCID. (IE 12 with 0 UE support). (7/22/24 - met)